# Patient Record
Sex: FEMALE | Race: WHITE | NOT HISPANIC OR LATINO
[De-identification: names, ages, dates, MRNs, and addresses within clinical notes are randomized per-mention and may not be internally consistent; named-entity substitution may affect disease eponyms.]

---

## 2017-01-06 ENCOUNTER — APPOINTMENT (OUTPATIENT)
Dept: OTOLARYNGOLOGY | Facility: CLINIC | Age: 57
End: 2017-01-06

## 2017-01-06 VITALS
OXYGEN SATURATION: 96 % | SYSTOLIC BLOOD PRESSURE: 119 MMHG | TEMPERATURE: 97.9 F | HEART RATE: 72 BPM | DIASTOLIC BLOOD PRESSURE: 82 MMHG

## 2017-01-18 ENCOUNTER — APPOINTMENT (OUTPATIENT)
Dept: OTOLARYNGOLOGY | Facility: CLINIC | Age: 57
End: 2017-01-18

## 2017-01-18 VITALS
OXYGEN SATURATION: 96 % | TEMPERATURE: 98.5 F | HEART RATE: 64 BPM | DIASTOLIC BLOOD PRESSURE: 87 MMHG | SYSTOLIC BLOOD PRESSURE: 128 MMHG

## 2017-01-18 RX ORDER — CIPROFLOXACIN AND DEXAMETHASONE 3; 1 MG/ML; MG/ML
0.3-0.1 SUSPENSION/ DROPS AURICULAR (OTIC)
Qty: 1 | Refills: 1 | Status: ACTIVE | COMMUNITY
Start: 2017-01-18 | End: 1900-01-01

## 2017-01-19 RX ORDER — OFLOXACIN 3 MG/ML
0.3 SOLUTION/ DROPS OPHTHALMIC TWICE DAILY
Qty: 1 | Refills: 2 | Status: ACTIVE | COMMUNITY
Start: 2017-01-19 | End: 1900-01-01

## 2017-01-25 ENCOUNTER — APPOINTMENT (OUTPATIENT)
Dept: OTOLARYNGOLOGY | Facility: CLINIC | Age: 57
End: 2017-01-25

## 2017-01-25 VITALS
OXYGEN SATURATION: 96 % | TEMPERATURE: 98.2 F | HEART RATE: 71 BPM | DIASTOLIC BLOOD PRESSURE: 85 MMHG | SYSTOLIC BLOOD PRESSURE: 125 MMHG

## 2017-01-25 DIAGNOSIS — Z09 ENCOUNTER FOR FOLLOW-UP EXAMINATION AFTER COMPLETED TREATMENT FOR CONDITIONS OTHER THAN MALIGNANT NEOPLASM: ICD-10-CM

## 2017-02-06 ENCOUNTER — APPOINTMENT (OUTPATIENT)
Dept: OTOLARYNGOLOGY | Facility: CLINIC | Age: 57
End: 2017-02-06

## 2017-03-13 ENCOUNTER — APPOINTMENT (OUTPATIENT)
Dept: OTOLARYNGOLOGY | Facility: CLINIC | Age: 57
End: 2017-03-13

## 2020-10-01 ENCOUNTER — APPOINTMENT (OUTPATIENT)
Dept: OTOLARYNGOLOGY | Facility: CLINIC | Age: 60
End: 2020-10-01
Payer: COMMERCIAL

## 2020-10-01 VITALS
BODY MASS INDEX: 31.41 KG/M2 | DIASTOLIC BLOOD PRESSURE: 97 MMHG | TEMPERATURE: 98 F | HEIGHT: 64 IN | WEIGHT: 184 LBS | OXYGEN SATURATION: 98 % | SYSTOLIC BLOOD PRESSURE: 155 MMHG | HEART RATE: 70 BPM

## 2020-10-01 DIAGNOSIS — H91.90 UNSPECIFIED HEARING LOSS, UNSPECIFIED EAR: ICD-10-CM

## 2020-10-01 DIAGNOSIS — R49.9 UNSPECIFIED VOICE AND RESONANCE DISORDER: ICD-10-CM

## 2020-10-01 DIAGNOSIS — H93.8X1 OTHER SPECIFIED DISORDERS OF RIGHT EAR: ICD-10-CM

## 2020-10-01 DIAGNOSIS — R13.14 DYSPHAGIA, PHARYNGOESOPHAGEAL PHASE: ICD-10-CM

## 2020-10-01 DIAGNOSIS — R09.89 OTHER SPECIFIED SYMPTOMS AND SIGNS INVOLVING THE CIRCULATORY AND RESPIRATORY SYSTEMS: ICD-10-CM

## 2020-10-01 DIAGNOSIS — R13.10 DYSPHAGIA, UNSPECIFIED: ICD-10-CM

## 2020-10-01 DIAGNOSIS — Z87.891 PERSONAL HISTORY OF NICOTINE DEPENDENCE: ICD-10-CM

## 2020-10-01 DIAGNOSIS — Z82.49 FAMILY HISTORY OF ISCHEMIC HEART DISEASE AND OTHER DISEASES OF THE CIRCULATORY SYSTEM: ICD-10-CM

## 2020-10-01 DIAGNOSIS — Z78.9 OTHER SPECIFIED HEALTH STATUS: ICD-10-CM

## 2020-10-01 DIAGNOSIS — H80.01: ICD-10-CM

## 2020-10-01 DIAGNOSIS — Z87.09 PERSONAL HISTORY OF OTHER DISEASES OF THE RESPIRATORY SYSTEM: ICD-10-CM

## 2020-10-01 DIAGNOSIS — R49.8 OTHER VOICE AND RESONANCE DISORDERS: ICD-10-CM

## 2020-10-01 PROCEDURE — 92550 TYMPANOMETRY & REFLEX THRESH: CPT

## 2020-10-01 PROCEDURE — 31575 DIAGNOSTIC LARYNGOSCOPY: CPT

## 2020-10-01 PROCEDURE — 92557 COMPREHENSIVE HEARING TEST: CPT

## 2020-10-01 PROCEDURE — 99204 OFFICE O/P NEW MOD 45 MIN: CPT | Mod: 25

## 2020-10-01 RX ORDER — OMEPRAZOLE 40 MG/1
40 CAPSULE, DELAYED RELEASE ORAL
Qty: 30 | Refills: 5 | Status: ACTIVE | COMMUNITY
Start: 2020-10-01 | End: 1900-01-01

## 2020-10-02 RX ORDER — ROSUVASTATIN CALCIUM 5 MG/1
TABLET, FILM COATED ORAL
Refills: 0 | Status: ACTIVE | COMMUNITY

## 2020-10-02 NOTE — PROCEDURE
[Unable to Cooperate with Mirror] : patient unable to cooperate with mirror [Hoarseness] : hoarseness not clearly evaluated by indirect laryngoscopy [Dysphagia] : dysphagia not clearly evaluated by indirect laryngoscopy [Topical Lidocaine] : topical lidocaine [Flexible Endoscope] : examined with the flexible endoscope [Serial Number: ___] : Serial Number: [unfilled] [Normal] : the false vocal folds were pink and regular, the ventricular sulcus was open, the true vocal folds were glistening white, tense and of equal length, mobility, and height [Interarytenoid Edema] : interarytenoid area edematous [de-identified] : iapriya cw rl\par done for dysphaiga and hoarseness

## 2020-10-02 NOTE — HISTORY OF PRESENT ILLNESS
[de-identified] : 60F who presents with 1 year of globus sensation. She reports that she has had a feeling of constant mucus and globus sensation, moderate in severity, in her throat. . She has seen an outside ENT who performed laryngoscopy which she says was normal. She admits to some hoarseness that she describes as a deepening of her voice for a few months. She denies any sore throat or odynophagia. She is a nonsmoker. \par \par She also admits to R increased hl over the past yr. she has h/o l stapes surgery for otosclerosis almost 4 yrs ago. She denies any tinnitus, otalgia, otorrhea, vertigo. She does admit to improved hearing after her L stapedotomy and wishes to consider for r ear, but not at this time. \par \par No other ENT complaints. she is a nonsmoker and denies any fh related to cc.

## 2020-11-05 ENCOUNTER — APPOINTMENT (OUTPATIENT)
Dept: OTOLARYNGOLOGY | Facility: CLINIC | Age: 60
End: 2020-11-05

## 2021-02-26 ENCOUNTER — NON-APPOINTMENT (OUTPATIENT)
Age: 61
End: 2021-02-26

## 2021-02-26 ENCOUNTER — APPOINTMENT (OUTPATIENT)
Dept: OTOLARYNGOLOGY | Facility: CLINIC | Age: 61
End: 2021-02-26

## 2021-03-05 ENCOUNTER — NON-APPOINTMENT (OUTPATIENT)
Age: 61
End: 2021-03-05

## 2021-03-15 ENCOUNTER — NON-APPOINTMENT (OUTPATIENT)
Age: 61
End: 2021-03-15

## 2021-03-16 ENCOUNTER — NON-APPOINTMENT (OUTPATIENT)
Age: 61
End: 2021-03-16

## 2021-03-16 RX ORDER — DIAZEPAM 5 MG/1
5 TABLET ORAL
Qty: 1 | Refills: 0 | Status: ACTIVE | COMMUNITY
Start: 2021-03-16 | End: 1900-01-01

## 2021-03-17 ENCOUNTER — NON-APPOINTMENT (OUTPATIENT)
Age: 61
End: 2021-03-17

## 2021-03-17 ENCOUNTER — APPOINTMENT (OUTPATIENT)
Dept: OTOLARYNGOLOGY | Facility: CLINIC | Age: 61
End: 2021-03-17

## 2021-03-18 ENCOUNTER — NON-APPOINTMENT (OUTPATIENT)
Age: 61
End: 2021-03-18

## 2021-03-26 ENCOUNTER — NON-APPOINTMENT (OUTPATIENT)
Age: 61
End: 2021-03-26

## 2021-04-20 ENCOUNTER — APPOINTMENT (OUTPATIENT)
Dept: OTOLARYNGOLOGY | Facility: AMBULATORY SURGERY CENTER | Age: 61
End: 2021-04-20

## 2022-02-09 ENCOUNTER — APPOINTMENT (OUTPATIENT)
Dept: OTOLARYNGOLOGY | Facility: CLINIC | Age: 62
End: 2022-02-09

## 2022-03-09 ENCOUNTER — APPOINTMENT (OUTPATIENT)
Dept: OTOLARYNGOLOGY | Facility: CLINIC | Age: 62
End: 2022-03-09
Payer: COMMERCIAL

## 2022-03-09 VITALS
BODY MASS INDEX: 30.73 KG/M2 | SYSTOLIC BLOOD PRESSURE: 135 MMHG | HEART RATE: 101 BPM | HEIGHT: 64 IN | WEIGHT: 180 LBS | DIASTOLIC BLOOD PRESSURE: 85 MMHG | OXYGEN SATURATION: 95 % | TEMPERATURE: 97.7 F

## 2022-03-09 DIAGNOSIS — H80.01: ICD-10-CM

## 2022-03-09 DIAGNOSIS — J30.1 ALLERGIC RHINITIS DUE TO POLLEN: ICD-10-CM

## 2022-03-09 DIAGNOSIS — H90.A31 MIXED CONDUCTIVE AND SENSORINEURAL HEARING LOSS, UNILATERAL, RIGHT EAR WITH RESTRICTED HEARING ON THE  CONTRALATERAL SIDE: ICD-10-CM

## 2022-03-09 DIAGNOSIS — H80.91 UNSPECIFIED OTOSCLEROSIS, RIGHT EAR: ICD-10-CM

## 2022-03-09 PROCEDURE — 92557 COMPREHENSIVE HEARING TEST: CPT

## 2022-03-09 PROCEDURE — 92550 TYMPANOMETRY & REFLEX THRESH: CPT

## 2022-03-09 PROCEDURE — 99214 OFFICE O/P EST MOD 30 MIN: CPT

## 2022-03-09 RX ORDER — MONTELUKAST 10 MG/1
10 TABLET, FILM COATED ORAL DAILY
Qty: 30 | Refills: 3 | Status: ACTIVE | COMMUNITY
Start: 2022-03-09 | End: 1900-01-01

## 2022-03-09 NOTE — ASSESSMENT
[FreeTextEntry1] : 1. AR\par -MRI from 3/ 2021 showed mild sinus congestion, deviated septum to L- images and report reviewed with pt\par -avoid allergens\par -saline rinses\par -pt does not have pets \par -Singulair, confirmed no anxiety/ depression \par 2. R otosclerosis\par - showed mod mixed HL R, mod snhl- L - results reviewed with pt \par -discussed technique/risks/ benefits/ alts to stapes surgery vs hae, pt is interested in surgery, brought to assistant to help arrange; she also wished to discuss ha with audiologist - she did so before she left the office\par she said she would call if she is interested in setting up stapes surgery

## 2022-03-09 NOTE — DATA REVIEWED
[de-identified] :  showed mod mixed HL R, mod snhl- L - results reviewed with pt  [de-identified] : MRI from 3/ 2021 showed mild sinus congestion, deviated septum to L- images and report reviewed with pt; ct from 2016 reviewed with pt no sscd, nl ear anatomy

## 2022-03-09 NOTE — REASON FOR VISIT
[Subsequent Evaluation] : a subsequent evaluation for [FreeTextEntry2] : R hearing loss, nasal congestion

## 2022-03-09 NOTE — HISTORY OF PRESENT ILLNESS
[de-identified] : 2.5 year followup for this 63 y/o F with h/o L stapes surgery for otosclerosis approximately 6 years ago. She was scheduled for R stapes surgery in 3/2021 but she did not want to proceed during covid pandemic. She is presenting today because she is interested in pursuing surgery. She has R hl, feels it is about the same since last visit, but worsens when she sleeps on L side. She has occasional tinnitus in R ear, denies otalgia, drainage, or dizziness. She also c/o chronic nasal congestion. Denies fevers, chills, sweats. No other ENT complaints at this time.

## 2022-03-09 NOTE — PHYSICAL EXAM
[Midline] : trachea located in midline position [Normal] : no rashes [de-identified] : engorged turbinates cw AR  [de-identified] : engorged cw AR  [de-identified] : gait steady; reverses 2 r tuning forks

## 2022-03-10 ENCOUNTER — NON-APPOINTMENT (OUTPATIENT)
Age: 62
End: 2022-03-10

## 2023-07-24 ENCOUNTER — APPOINTMENT (OUTPATIENT)
Dept: OTOLARYNGOLOGY | Facility: CLINIC | Age: 63
End: 2023-07-24

## 2023-07-24 ENCOUNTER — APPOINTMENT (OUTPATIENT)
Dept: PHARMACY | Facility: CLINIC | Age: 63
End: 2023-07-24

## 2024-07-16 ENCOUNTER — APPOINTMENT (OUTPATIENT)
Dept: OTOLARYNGOLOGY | Facility: CLINIC | Age: 64
End: 2024-07-16
Payer: MEDICARE

## 2024-07-16 VITALS — BODY MASS INDEX: 30.73 KG/M2 | HEIGHT: 64 IN | WEIGHT: 180 LBS

## 2024-07-16 DIAGNOSIS — H80.91 UNSPECIFIED OTOSCLEROSIS, RIGHT EAR: ICD-10-CM

## 2024-07-16 DIAGNOSIS — H90.8 MIXED CONDUCTIVE AND SENSORINEURAL HEARING LOSS, UNSPECIFIED: ICD-10-CM

## 2024-07-16 DIAGNOSIS — H93.299 OTHER ABNORMAL AUDITORY PERCEPTIONS, UNSPECIFIED EAR: ICD-10-CM

## 2024-07-16 PROCEDURE — 99204 OFFICE O/P NEW MOD 45 MIN: CPT

## 2024-07-16 PROCEDURE — 92557 COMPREHENSIVE HEARING TEST: CPT

## 2024-07-16 PROCEDURE — 92550 TYMPANOMETRY & REFLEX THRESH: CPT | Mod: 52

## 2024-07-16 PROCEDURE — 92504 EAR MICROSCOPY EXAMINATION: CPT

## 2024-08-05 ENCOUNTER — APPOINTMENT (OUTPATIENT)
Dept: OTOLARYNGOLOGY | Facility: CLINIC | Age: 64
End: 2024-08-05

## 2024-08-05 PROCEDURE — 99212 OFFICE O/P EST SF 10 MIN: CPT

## 2024-08-05 NOTE — ASSESSMENT
[FreeTextEntry1] : The etiology of recurrent hearing loss was discussed in detail.  CT did not confirm otosclerosis.  Otosclerosis remains the most probable diagnosis.  Management options reviewed in detail.  The use of amplification was discussed.  She wished to proceed with surgical procedure as planned.

## 2024-08-05 NOTE — DATA REVIEWED
[de-identified] : Audiometry reviewed [de-identified] : CT scan including images reviewed in detail

## 2024-08-05 NOTE — HISTORY OF PRESENT ILLNESS
[Home] : at home, [unfilled] , at the time of the visit. [Medical Office: ([facility])___] : at the medical office located in  [de-identified] :  KILO MARTÍNEZ has a history of progressive hearing loss since 2014. Subsequently underwent left stapes surgery in 2021 with good benefit. Progressive hearing loss in the right ear for years. Having difficulty with sound localization. No tinnitus reported. No vertigo. No prior ear disease. [FreeTextEntry1] : 08/05/2024  Follow-up for patient with hearing loss.  Patient underwent CT scan.  I have reviewed the scan and the report in detail.  She reports no changes in hearing.  She is scheduled for surgery in the near future.

## 2024-08-08 ENCOUNTER — TRANSCRIPTION ENCOUNTER (OUTPATIENT)
Age: 64
End: 2024-08-08

## 2024-08-08 NOTE — ASU PATIENT PROFILE, ADULT - NSICDXPASTMEDICALHX_GEN_ALL_CORE_FT
PAST MEDICAL HISTORY:  Hyperlipidemia      PAST MEDICAL HISTORY:  Hyperlipidemia MEDS recently RX pt has not started yet

## 2024-08-08 NOTE — ASU PATIENT PROFILE, ADULT - NS PREOP UNDERSTANDS INFO
No solid food/dairy/candy/gum after 11:30pm tonight; water/clear apple juice/black coffee/tea/gatorade allowed before 04:30am tomorrow; patient reminded to come with photo ID/insurance/credit card; dress in comfortable clothes; no jewelries/contact lens/valuable; no smoking/alcohol drinking/recreational drug use today; escort to have photo ID; address and callback number was given/yes

## 2024-08-08 NOTE — ASU PATIENT PROFILE, ADULT - NSICDXPASTSURGICALHX_GEN_ALL_CORE_FT
PAST SURGICAL HISTORY:  S/P ear surgery left    S/P meniscectomy left     PAST SURGICAL HISTORY:  History of bunionectomy     S/P ear surgery left    S/P meniscectomy left

## 2024-08-09 ENCOUNTER — APPOINTMENT (OUTPATIENT)
Dept: OTOLARYNGOLOGY | Facility: AMBULATORY SURGERY CENTER | Age: 64
End: 2024-08-09

## 2024-08-09 ENCOUNTER — OUTPATIENT (OUTPATIENT)
Dept: OUTPATIENT SERVICES | Facility: HOSPITAL | Age: 64
LOS: 1 days | Discharge: HOME | End: 2024-08-09
Payer: MEDICARE

## 2024-08-09 ENCOUNTER — RESULT REVIEW (OUTPATIENT)
Age: 64
End: 2024-08-09

## 2024-08-09 ENCOUNTER — TRANSCRIPTION ENCOUNTER (OUTPATIENT)
Age: 64
End: 2024-08-09

## 2024-08-09 VITALS
OXYGEN SATURATION: 95 % | HEART RATE: 93 BPM | RESPIRATION RATE: 16 BRPM | SYSTOLIC BLOOD PRESSURE: 142 MMHG | DIASTOLIC BLOOD PRESSURE: 75 MMHG

## 2024-08-09 VITALS
HEIGHT: 64 IN | HEART RATE: 58 BPM | RESPIRATION RATE: 15 BRPM | SYSTOLIC BLOOD PRESSURE: 139 MMHG | TEMPERATURE: 97 F | WEIGHT: 179.9 LBS | DIASTOLIC BLOOD PRESSURE: 80 MMHG | OXYGEN SATURATION: 97 %

## 2024-08-09 DIAGNOSIS — Z98.890 OTHER SPECIFIED POSTPROCEDURAL STATES: Chronic | ICD-10-CM

## 2024-08-09 PROCEDURE — 95867 NDL EMG CRANIAL NRV MUSC UNI: CPT | Mod: 26

## 2024-08-09 PROCEDURE — 88311 DECALCIFY TISSUE: CPT | Mod: 26

## 2024-08-09 PROCEDURE — 88304 TISSUE EXAM BY PATHOLOGIST: CPT | Mod: 26

## 2024-08-09 PROCEDURE — 37799 UNLISTED PX VASCULAR SURGERY: CPT | Mod: LT

## 2024-08-09 PROCEDURE — 69660 REVISE MIDDLE EAR BONE: CPT | Mod: RT

## 2024-08-09 DEVICE — SURGIFOAM PAD 2CM X 6CM X 7MM (12-7): Type: IMPLANTABLE DEVICE | Site: RIGHT | Status: FUNCTIONAL

## 2024-08-09 DEVICE — IMP STAPES OSSICULAR BARTELS 4X4.25MM: Type: IMPLANTABLE DEVICE | Site: RIGHT | Status: FUNCTIONAL

## 2024-08-09 RX ORDER — FENTANYL CITRATE 1200 UG/1
25 LOZENGE ORAL; TRANSMUCOSAL
Refills: 0 | Status: DISCONTINUED | OUTPATIENT
Start: 2024-08-09 | End: 2024-08-09

## 2024-08-09 RX ORDER — CELECOXIB 200 MG/1
200 CAPSULE ORAL ONCE
Refills: 0 | Status: COMPLETED | OUTPATIENT
Start: 2024-08-09 | End: 2024-08-09

## 2024-08-09 RX ORDER — DEXTROSE MONOHYDRATE, SODIUM CHLORIDE, SODIUM LACTATE, CALCIUM CHLORIDE, MAGNESIUM CHLORIDE 1.5; 538; 448; 18.4; 5.08 G/100ML; MG/100ML; MG/100ML; MG/100ML; MG/100ML
500 SOLUTION INTRAPERITONEAL
Refills: 0 | Status: DISCONTINUED | OUTPATIENT
Start: 2024-08-09 | End: 2024-08-09

## 2024-08-09 RX ORDER — ACETAMINOPHEN 500 MG
1000 TABLET ORAL ONCE
Refills: 0 | Status: COMPLETED | OUTPATIENT
Start: 2024-08-09 | End: 2024-08-09

## 2024-08-09 RX ORDER — APREPITANT 40 MG
40 CAPSULE ORAL ONCE
Refills: 0 | Status: COMPLETED | OUTPATIENT
Start: 2024-08-09 | End: 2024-08-09

## 2024-08-09 RX ORDER — LORATADINE 10 MG
1 TABLET ORAL
Refills: 0 | DISCHARGE

## 2024-08-09 RX ADMIN — Medication 1000 MILLIGRAM(S): at 06:50

## 2024-08-09 RX ADMIN — CELECOXIB 200 MILLIGRAM(S): 200 CAPSULE ORAL at 10:25

## 2024-08-09 RX ADMIN — Medication 40 MILLIGRAM(S): at 07:03

## 2024-08-09 RX ADMIN — DEXTROSE MONOHYDRATE, SODIUM CHLORIDE, SODIUM LACTATE, CALCIUM CHLORIDE, MAGNESIUM CHLORIDE 100 MILLILITER(S): 1.5; 538; 448; 18.4; 5.08 SOLUTION INTRAPERITONEAL at 09:56

## 2024-08-09 RX ADMIN — CELECOXIB 200 MILLIGRAM(S): 200 CAPSULE ORAL at 09:56

## 2024-08-09 NOTE — BRIEF OPERATIVE NOTE - NSICDXBRIEFPREOP_GEN_ALL_CORE_FT
PRE-OP DIAGNOSIS:  Conductive hearing loss due to otosclerosis 09-Aug-2024 09:05:10  Yovany Parikh

## 2024-08-09 NOTE — BRIEF OPERATIVE NOTE - NSICDXBRIEFPOSTOP_GEN_ALL_CORE_FT
POST-OP DIAGNOSIS:  Conductive hearing loss due to otosclerosis 09-Aug-2024 09:05:29  Yovany Parikh

## 2024-08-09 NOTE — BRIEF OPERATIVE NOTE - NSICDXBRIEFPROCEDURE_GEN_ALL_CORE_FT
PROCEDURES:  Laser stapedotomy of right ear 09-Aug-2024 09:04:38  Yovany Parikh  Exploration, vein 09-Aug-2024 09:04:47  Yovany Parikh  Electromyography (EMG) of unilateral facial muscle 09-Aug-2024 09:04:55  Yovany Parikh

## 2024-08-14 ENCOUNTER — APPOINTMENT (OUTPATIENT)
Dept: OTOLARYNGOLOGY | Facility: CLINIC | Age: 64
End: 2024-08-14
Payer: MEDICARE

## 2024-08-14 VITALS — WEIGHT: 180 LBS | BODY MASS INDEX: 30.73 KG/M2 | HEIGHT: 64 IN

## 2024-08-14 DIAGNOSIS — H80.91 UNSPECIFIED OTOSCLEROSIS, RIGHT EAR: ICD-10-CM

## 2024-08-14 LAB — SURGICAL PATHOLOGY STUDY: SIGNIFICANT CHANGE UP

## 2024-08-14 PROCEDURE — 99024 POSTOP FOLLOW-UP VISIT: CPT

## 2024-08-14 NOTE — HISTORY OF PRESENT ILLNESS
[de-identified] : KILO MARTÍNEZ has a history of progressive hearing loss since 2014. Subsequently underwent left stapes surgery in 2021 with good benefit. Progressive hearing loss in the right ear for years. Having difficulty with sound localization. No tinnitus reported. No vertigo. No prior ear disease. [FreeTextEntry1] : 08/14/2024 History post operative visit. Reports no significant pain.  No significant tinnitus.  No significant vertigo.  No bleeding reported.  Physical Exam  Face: Normal Function bilaterally  Oral: Normal  Neck: No mass, Full range of motion  Hand: Incision intact, non inflamed   Microscopic Ear Exam Right Ear:  Ear canal packing removed gently with forceps.  No significant inflammation noted.  The tympanic membrane is intact.  Tuning Fork Testing 512 Hz: Freeman test: referred to the midline; reported improved hearing in the right ear  Wound care instructions were reviewed with the patient in detail. Followup plans discussed.

## 2024-08-19 PROBLEM — E78.5 HYPERLIPIDEMIA, UNSPECIFIED: Chronic | Status: ACTIVE | Noted: 2024-08-08

## 2024-09-11 ENCOUNTER — APPOINTMENT (OUTPATIENT)
Dept: OTOLARYNGOLOGY | Facility: CLINIC | Age: 64
End: 2024-09-11
Payer: MEDICARE

## 2024-09-11 DIAGNOSIS — H90.12 CONDUCTIVE HEARING LOSS, UNILATERAL, LEFT EAR, WITH UNRESTRICTED HEARING ON THE CONTRALATERAL SIDE: ICD-10-CM

## 2024-09-11 DIAGNOSIS — H90.3 SENSORINEURAL HEARING LOSS, BILATERAL: ICD-10-CM

## 2024-09-11 PROCEDURE — 99024 POSTOP FOLLOW-UP VISIT: CPT

## 2024-09-11 PROCEDURE — 92567 TYMPANOMETRY: CPT

## 2024-09-11 PROCEDURE — 92557 COMPREHENSIVE HEARING TEST: CPT

## 2024-09-13 PROBLEM — H90.3 SENSORINEURAL HEARING LOSS (SNHL) OF BOTH EARS: Status: ACTIVE | Noted: 2024-09-13

## 2024-11-27 ENCOUNTER — APPOINTMENT (OUTPATIENT)
Dept: OTOLARYNGOLOGY | Facility: CLINIC | Age: 64
End: 2024-11-27

## 2024-12-19 ENCOUNTER — APPOINTMENT (OUTPATIENT)
Dept: OTOLARYNGOLOGY | Facility: CLINIC | Age: 64
End: 2024-12-19
Payer: MEDICARE

## 2024-12-19 ENCOUNTER — NON-APPOINTMENT (OUTPATIENT)
Age: 64
End: 2024-12-19

## 2024-12-19 DIAGNOSIS — H61.20 IMPACTED CERUMEN, UNSPECIFIED EAR: ICD-10-CM

## 2024-12-19 DIAGNOSIS — H80.91 UNSPECIFIED OTOSCLEROSIS, RIGHT EAR: ICD-10-CM

## 2024-12-19 DIAGNOSIS — H90.3 SENSORINEURAL HEARING LOSS, BILATERAL: ICD-10-CM

## 2024-12-19 DIAGNOSIS — H90.A32 MIXED CONDUCTIVE AND SENSORINEURAL HEARING, UNILATERAL, LEFT EAR WITH RESTRICTED HEARING ON THE  CONTRALATERAL SIDE: ICD-10-CM

## 2024-12-19 PROCEDURE — 92557 COMPREHENSIVE HEARING TEST: CPT

## 2024-12-19 PROCEDURE — 92550 TYMPANOMETRY & REFLEX THRESH: CPT | Mod: 52

## 2024-12-19 PROCEDURE — G0268 REMOVAL OF IMPACTED WAX MD: CPT

## 2024-12-19 PROCEDURE — 99213 OFFICE O/P EST LOW 20 MIN: CPT | Mod: 25

## (undated) DEVICE — DRSG STRIP OTOSILK OTOLOGICAL

## (undated) DEVICE — PACK MASTOID/MIDDLE EAR

## (undated) DEVICE — KNIFE ALCON MVR V-LANCE 23G (BLACK)

## (undated) DEVICE — SUT VICRYL 3-0 18" PS-2 UNDYED

## (undated) DEVICE — DRSG TEGADERM 2.5X3"

## (undated) DEVICE — DRAPE MICROSCOPE ZEISS

## (undated) DEVICE — CATH IV SAFE INSYTE 18G X 1.88" (GREEN)

## (undated) DEVICE — DRAPE MICROSCOPE LEICA 54" X 150"

## (undated) DEVICE — NDL BLD BEAVER CUTTING EDGE 3.0MM

## (undated) DEVICE — GOWN SLEEVES

## (undated) DEVICE — WARMING BLANKET FULL ADULT

## (undated) DEVICE — VENODYNE/SCD SLEEVE CALF MEDIUM

## (undated) DEVICE — BLADE TYMPANOPLASTY 2.5MM W 60 DEGREE BEVEL DOWN

## (undated) DEVICE — COTTONBALL LG

## (undated) DEVICE — DRAPE APERTURE

## (undated) DEVICE — GLV 7.5 PROTEXIS (WHITE)

## (undated) DEVICE — SUT PLAIN GUT FAST ABSORBING 5-0 PC-1

## (undated) DEVICE — BUR DMD RD 0.7X100MM

## (undated) DEVICE — BLADE SCALPEL SAFETY #15 WITH PLASTIC GREEN HANDLE

## (undated) DEVICE — PROBE INCREMT STD PRASS TIP

## (undated) DEVICE — ELCTR NDL SUBDERMAL 2 CHANNEL

## (undated) DEVICE — DRAPE STERI-DRAPE MEDIUM W APERTURE

## (undated) DEVICE — PETRI DISH MED 3.5"

## (undated) DEVICE — SUT MONOCRYL 3-0 27" PS-2 UNDYED

## (undated) DEVICE — PREP ALCOHOL PAD MED

## (undated) DEVICE — DRSG STERISTRIPS 0.25 X 4"

## (undated) DEVICE — BEAVER BLADE MIN-BLADE ROUNDED TIP 1-SIDE SHARP (GREEN)

## (undated) DEVICE — DRSG MASTISOL

## (undated) DEVICE — SUT MONOCRYL 5-0 18" P-1 UNDYED

## (undated) DEVICE — SUT SILK 3-0 18" TIES

## (undated) DEVICE — DRSG GAUZE SPONGE 2X2" STERILE

## (undated) DEVICE — BLADE BEAVER MINI MIS 6400

## (undated) DEVICE — SUT VICRYL 3-0 18" TIES UNDYED